# Patient Record
Sex: FEMALE | Race: WHITE | NOT HISPANIC OR LATINO | URBAN - METROPOLITAN AREA
[De-identification: names, ages, dates, MRNs, and addresses within clinical notes are randomized per-mention and may not be internally consistent; named-entity substitution may affect disease eponyms.]

---

## 2020-10-01 ENCOUNTER — OFFICE VISIT (OUTPATIENT)
Dept: URBAN - METROPOLITAN AREA CLINIC 68 | Facility: CLINIC | Age: 65
End: 2020-10-01

## 2021-01-13 ENCOUNTER — OFFICE VISIT (OUTPATIENT)
Dept: URBAN - METROPOLITAN AREA CLINIC 68 | Facility: CLINIC | Age: 66
End: 2021-01-13

## 2021-02-10 ENCOUNTER — OFFICE VISIT (OUTPATIENT)
Dept: URBAN - METROPOLITAN AREA SURGERY CENTER 12 | Facility: SURGERY CENTER | Age: 66
End: 2021-02-10

## 2021-02-11 ENCOUNTER — LAB OUTSIDE AN ENCOUNTER (OUTPATIENT)
Dept: URBAN - METROPOLITAN AREA CLINIC 68 | Facility: CLINIC | Age: 66
End: 2021-02-11

## 2021-02-11 LAB — 01: (no result)

## 2021-02-15 ENCOUNTER — TELEPHONE ENCOUNTER (OUTPATIENT)
Dept: URBAN - METROPOLITAN AREA CLINIC 68 | Facility: CLINIC | Age: 66
End: 2021-02-15

## 2021-03-05 ENCOUNTER — OFFICE VISIT (OUTPATIENT)
Dept: URBAN - METROPOLITAN AREA CLINIC 68 | Facility: CLINIC | Age: 66
End: 2021-03-05

## 2022-06-04 ENCOUNTER — TELEPHONE ENCOUNTER (OUTPATIENT)
Dept: URBAN - METROPOLITAN AREA CLINIC 68 | Facility: CLINIC | Age: 67
End: 2022-06-04

## 2022-06-05 ENCOUNTER — TELEPHONE ENCOUNTER (OUTPATIENT)
Dept: URBAN - METROPOLITAN AREA CLINIC 68 | Facility: CLINIC | Age: 67
End: 2022-06-05

## 2022-06-05 RX ORDER — SIMVASTATIN 20 MG/1
SIMVASTATIN( 20MG ORAL  DAILY ) ACTIVE -HX ENTRY TABLET, FILM COATED ORAL DAILY
Status: ACTIVE | COMMUNITY
Start: 2021-03-05

## 2022-06-05 RX ORDER — OMEPRAZOLE 20 MG/1
OMEPRAZOLE( 20MG ORAL  TWICE A DAY ) ACTIVE -HX ENTRY TABLET, DELAYED RELEASE ORAL TWICE A DAY
Status: ACTIVE | COMMUNITY
Start: 2021-03-05

## 2022-06-05 RX ORDER — POTASSIUM CHLORIDE 750 MG/1
KLOR-CON M10( 10MEQ ORAL  DAILY ) ACTIVE -HX ENTRY TABLET, EXTENDED RELEASE ORAL DAILY
Status: ACTIVE | COMMUNITY
Start: 2021-03-05

## 2022-06-05 RX ORDER — ATENOLOL 50 MG/1
ATENOLOL( 50MG ORAL  DAILY ) ACTIVE -HX ENTRY TABLET ORAL DAILY
Status: ACTIVE | COMMUNITY
Start: 2021-03-05

## 2022-06-05 RX ORDER — BIFIDOBACTERIUM LONGUM 10MM CELL
ALIGN( 4MG ORAL  DAILY ) ACTIVE -HX ENTRY CAPSULE ORAL DAILY
Status: ACTIVE | COMMUNITY
Start: 2021-03-05

## 2022-06-05 RX ORDER — HYDROCHLOROTHIAZIDE 25 MG/1
HYDROCHLOROTHIAZIDE( 25MG ORAL  DAILY ) ACTIVE -HX ENTRY TABLET ORAL DAILY
Status: ACTIVE | COMMUNITY
Start: 2021-03-05

## 2022-06-05 RX ORDER — WARFARIN 10 MG/1
WARFARIN SODIUM( 10MG ORAL 13 MG DAILY ) ACTIVE -HX ENTRY TABLET ORAL DAILY
Status: ACTIVE | COMMUNITY
Start: 2021-03-05

## 2022-06-25 ENCOUNTER — TELEPHONE ENCOUNTER (OUTPATIENT)
Age: 67
End: 2022-06-25

## 2022-06-26 ENCOUNTER — TELEPHONE ENCOUNTER (OUTPATIENT)
Age: 67
End: 2022-06-26

## 2022-06-26 RX ORDER — BIFIDOBACTERIUM LONGUM 10MM CELL
ALIGN( 4MG ORAL  DAILY ) ACTIVE -HX ENTRY CAPSULE ORAL DAILY
Status: ACTIVE | COMMUNITY
Start: 2021-03-05

## 2022-06-26 RX ORDER — SIMVASTATIN 20 MG/1
SIMVASTATIN( 20MG ORAL  DAILY ) ACTIVE -HX ENTRY TABLET, FILM COATED ORAL DAILY
Status: ACTIVE | COMMUNITY
Start: 2021-03-05

## 2022-06-26 RX ORDER — POTASSIUM CHLORIDE 750 MG/1
KLOR-CON M10( 10MEQ ORAL  DAILY ) ACTIVE -HX ENTRY TABLET, EXTENDED RELEASE ORAL DAILY
Status: ACTIVE | COMMUNITY
Start: 2021-03-05

## 2022-06-26 RX ORDER — QUINIDINE SULFATE 200 MG
VITAMIN D3(   2000 DAILY ) ACTIVE -HX ENTRY TABLET ORAL DAILY
Status: ACTIVE | COMMUNITY
Start: 2021-03-05

## 2022-06-26 RX ORDER — OMEPRAZOLE 20 MG/1
OMEPRAZOLE( 20MG ORAL  TWICE A DAY ) ACTIVE -HX ENTRY TABLET, DELAYED RELEASE ORAL TWICE A DAY
Status: ACTIVE | COMMUNITY
Start: 2021-03-05

## 2022-06-26 RX ORDER — HYDROCHLOROTHIAZIDE 25 MG/1
HYDROCHLOROTHIAZIDE( 25MG ORAL  DAILY ) ACTIVE -HX ENTRY TABLET ORAL DAILY
Status: ACTIVE | COMMUNITY
Start: 2021-03-05

## 2022-06-26 RX ORDER — WARFARIN 10 MG/1
WARFARIN SODIUM( 10MG ORAL 13 MG DAILY ) ACTIVE -HX ENTRY TABLET ORAL DAILY
Status: ACTIVE | COMMUNITY
Start: 2021-03-05

## 2022-06-26 RX ORDER — ATENOLOL 50 MG/1
ATENOLOL( 50MG ORAL  DAILY ) ACTIVE -HX ENTRY TABLET ORAL DAILY
Status: ACTIVE | COMMUNITY
Start: 2021-03-05

## 2022-06-26 RX ORDER — CALCIUM CARBONATE/VITAMIN D3 600 MG-10
CALCIUM(   1200 MG DAILY ) ACTIVE -HX ENTRY TABLET ORAL DAILY
Status: ACTIVE | COMMUNITY
Start: 2021-03-05

## 2022-09-16 ENCOUNTER — OFFICE VISIT (OUTPATIENT)
Dept: URBAN - METROPOLITAN AREA CLINIC 68 | Facility: CLINIC | Age: 67
End: 2022-09-16

## 2022-09-16 RX ORDER — SOD SULF/POT CHLORIDE/MAG SULF 1.479 G
AS DIRECTED TABLET ORAL ONCE
Qty: 1 PACKET | Refills: 0 | OUTPATIENT
Start: 2022-09-16

## 2022-09-16 RX ORDER — OMEPRAZOLE 20 MG/1
OMEPRAZOLE( 20MG ORAL  TWICE A DAY ) ACTIVE -HX ENTRY TABLET, DELAYED RELEASE ORAL TWICE A DAY
Status: ACTIVE | COMMUNITY
Start: 2021-03-05

## 2022-09-16 RX ORDER — HYDROCHLOROTHIAZIDE 25 MG/1
HYDROCHLOROTHIAZIDE( 25MG ORAL  DAILY ) ACTIVE -HX ENTRY TABLET ORAL DAILY
Status: ACTIVE | COMMUNITY
Start: 2021-03-05

## 2022-09-16 RX ORDER — WARFARIN 10 MG/1
WARFARIN SODIUM( 10MG ORAL 13 MG DAILY ) ACTIVE -HX ENTRY TABLET ORAL DAILY
Status: ACTIVE | COMMUNITY
Start: 2021-03-05

## 2022-09-16 RX ORDER — BIFIDOBACTERIUM LONGUM 10MM CELL
ALIGN( 4MG ORAL  DAILY ) ACTIVE -HX ENTRY CAPSULE ORAL DAILY
Status: ACTIVE | COMMUNITY
Start: 2021-03-05

## 2022-09-16 RX ORDER — POTASSIUM CHLORIDE 750 MG/1
KLOR-CON M10( 10MEQ ORAL  DAILY ) ACTIVE -HX ENTRY TABLET, EXTENDED RELEASE ORAL DAILY
Status: ACTIVE | COMMUNITY
Start: 2021-03-05

## 2022-09-16 RX ORDER — ATENOLOL 50 MG/1
ATENOLOL( 50MG ORAL  DAILY ) ACTIVE -HX ENTRY TABLET ORAL DAILY
Status: ACTIVE | COMMUNITY
Start: 2021-03-05

## 2022-09-16 RX ORDER — SIMVASTATIN 20 MG/1
SIMVASTATIN( 20MG ORAL  DAILY ) ACTIVE -HX ENTRY TABLET, FILM COATED ORAL DAILY
Status: ACTIVE | COMMUNITY
Start: 2021-03-05

## 2022-09-16 NOTE — HPI-MIGRATED HPI
Transition of Care : Patient evaluated due to Heartburn and need for screenign colonoscopy.  Referred having intermittent episodes of heartburn even using PPI tx.  Denies nausea, vomits, dysphagia, odynophagia, abdominal pain, diarrhea, constipation GI bleeding or weight loss

## 2022-10-20 ENCOUNTER — OFFICE VISIT (OUTPATIENT)
Dept: URBAN - METROPOLITAN AREA SURGERY CENTER 12 | Facility: SURGERY CENTER | Age: 67
End: 2022-10-20

## 2022-10-20 RX ORDER — BIFIDOBACTERIUM LONGUM 10MM CELL
ALIGN( 4MG ORAL  DAILY ) ACTIVE -HX ENTRY CAPSULE ORAL DAILY
Status: ACTIVE | COMMUNITY
Start: 2021-03-05

## 2022-10-20 RX ORDER — ATENOLOL 50 MG/1
ATENOLOL( 50MG ORAL  DAILY ) ACTIVE -HX ENTRY TABLET ORAL DAILY
Status: ACTIVE | COMMUNITY
Start: 2021-03-05

## 2022-10-20 RX ORDER — WARFARIN 10 MG/1
WARFARIN SODIUM( 10MG ORAL 13 MG DAILY ) ACTIVE -HX ENTRY TABLET ORAL DAILY
Status: ACTIVE | COMMUNITY
Start: 2021-03-05

## 2022-10-20 RX ORDER — POTASSIUM CHLORIDE 750 MG/1
KLOR-CON M10( 10MEQ ORAL  DAILY ) ACTIVE -HX ENTRY TABLET, EXTENDED RELEASE ORAL DAILY
Status: ACTIVE | COMMUNITY
Start: 2021-03-05

## 2022-10-20 RX ORDER — HYDROCHLOROTHIAZIDE 25 MG/1
HYDROCHLOROTHIAZIDE( 25MG ORAL  DAILY ) ACTIVE -HX ENTRY TABLET ORAL DAILY
Status: ACTIVE | COMMUNITY
Start: 2021-03-05

## 2022-10-20 RX ORDER — OMEPRAZOLE 20 MG/1
OMEPRAZOLE( 20MG ORAL  TWICE A DAY ) ACTIVE -HX ENTRY TABLET, DELAYED RELEASE ORAL TWICE A DAY
Status: ACTIVE | COMMUNITY
Start: 2021-03-05

## 2022-10-20 RX ORDER — SIMVASTATIN 20 MG/1
SIMVASTATIN( 20MG ORAL  DAILY ) ACTIVE -HX ENTRY TABLET, FILM COATED ORAL DAILY
Status: ACTIVE | COMMUNITY
Start: 2021-03-05

## 2022-10-20 RX ORDER — SOD SULF/POT CHLORIDE/MAG SULF 1.479 G
AS DIRECTED TABLET ORAL ONCE
Qty: 1 PACKET | Refills: 0 | Status: ACTIVE | COMMUNITY
Start: 2022-09-16

## 2022-10-27 ENCOUNTER — TELEPHONE ENCOUNTER (OUTPATIENT)
Dept: URBAN - METROPOLITAN AREA CLINIC 68 | Facility: CLINIC | Age: 67
End: 2022-10-27

## 2022-11-02 ENCOUNTER — OFFICE VISIT (OUTPATIENT)
Dept: URBAN - METROPOLITAN AREA CLINIC 68 | Facility: CLINIC | Age: 67
End: 2022-11-02

## 2022-11-02 ENCOUNTER — TELEPHONE ENCOUNTER (OUTPATIENT)
Dept: URBAN - METROPOLITAN AREA CLINIC 68 | Facility: CLINIC | Age: 67
End: 2022-11-02

## 2022-11-02 RX ORDER — BIFIDOBACTERIUM LONGUM 10MM CELL
ALIGN( 4MG ORAL  DAILY ) ACTIVE -HX ENTRY CAPSULE ORAL DAILY
Status: ACTIVE | COMMUNITY
Start: 2021-03-05

## 2022-11-02 RX ORDER — PANTOPRAZOLE SODIUM 40 MG/1
1 TABLET TABLET, DELAYED RELEASE ORAL ONCE A DAY
Qty: 90 TABLET | Refills: 1 | OUTPATIENT
Start: 2022-11-02

## 2022-11-02 RX ORDER — POTASSIUM CHLORIDE 750 MG/1
KLOR-CON M10( 10MEQ ORAL  DAILY ) ACTIVE -HX ENTRY TABLET, EXTENDED RELEASE ORAL DAILY
Status: ACTIVE | COMMUNITY
Start: 2021-03-05

## 2022-11-02 RX ORDER — HYDROCHLOROTHIAZIDE 25 MG/1
HYDROCHLOROTHIAZIDE( 25MG ORAL  DAILY ) ACTIVE -HX ENTRY TABLET ORAL DAILY
Status: ACTIVE | COMMUNITY
Start: 2021-03-05

## 2022-11-02 RX ORDER — SOD SULF/POT CHLORIDE/MAG SULF 1.479 G
AS DIRECTED TABLET ORAL ONCE
Qty: 1 PACKET | Refills: 0 | Status: ACTIVE | COMMUNITY
Start: 2022-09-16

## 2022-11-02 RX ORDER — SIMVASTATIN 20 MG/1
SIMVASTATIN( 20MG ORAL  DAILY ) ACTIVE -HX ENTRY TABLET, FILM COATED ORAL DAILY
Status: ACTIVE | COMMUNITY
Start: 2021-03-05

## 2022-11-02 RX ORDER — WARFARIN 10 MG/1
WARFARIN SODIUM( 10MG ORAL 13 MG DAILY ) ACTIVE -HX ENTRY TABLET ORAL DAILY
Status: ACTIVE | COMMUNITY
Start: 2021-03-05

## 2022-11-02 RX ORDER — OMEPRAZOLE 20 MG/1
OMEPRAZOLE( 20MG ORAL  TWICE A DAY ) ACTIVE -HX ENTRY TABLET, DELAYED RELEASE ORAL TWICE A DAY
Status: ACTIVE | COMMUNITY
Start: 2021-03-05

## 2022-11-02 RX ORDER — ATENOLOL 50 MG/1
ATENOLOL( 50MG ORAL  DAILY ) ACTIVE -HX ENTRY TABLET ORAL DAILY
Status: ACTIVE | COMMUNITY
Start: 2021-03-05

## 2022-11-02 NOTE — HPI-MIGRATED HPI
Transition of Care : Patient evaluated after having recent EGD found with mild gastritis and colonoscopy with polyp x 1.  Patient consented for video-audio encounter using [facility] Complaint [facility]. Referred having intermittent episodes heartburn even on bid omeprazole tx Denies nausea, vomits, dysphagia, odynophagia, heartburn, abdominal pain, diarrhea, constipation GI bleeding or weight loss

## 2023-02-03 ENCOUNTER — OFFICE VISIT (OUTPATIENT)
Dept: URBAN - METROPOLITAN AREA CLINIC 68 | Facility: CLINIC | Age: 68
End: 2023-02-03

## 2023-02-07 ENCOUNTER — OFFICE VISIT (OUTPATIENT)
Dept: URBAN - METROPOLITAN AREA CLINIC 66 | Facility: CLINIC | Age: 68
End: 2023-02-07

## 2023-02-07 RX ORDER — ATENOLOL 50 MG/1
ATENOLOL( 50MG ORAL  DAILY ) ACTIVE -HX ENTRY TABLET ORAL DAILY
Status: ACTIVE | COMMUNITY
Start: 2021-03-05

## 2023-02-07 RX ORDER — SIMVASTATIN 20 MG/1
SIMVASTATIN( 20MG ORAL  DAILY ) ACTIVE -HX ENTRY TABLET, FILM COATED ORAL DAILY
Status: ACTIVE | COMMUNITY
Start: 2021-03-05

## 2023-02-07 RX ORDER — BIFIDOBACTERIUM LONGUM 10MM CELL
ALIGN( 4MG ORAL  DAILY ) ACTIVE -HX ENTRY CAPSULE ORAL DAILY
Status: ACTIVE | COMMUNITY
Start: 2021-03-05

## 2023-02-07 RX ORDER — SOD SULF/POT CHLORIDE/MAG SULF 1.479 G
AS DIRECTED TABLET ORAL ONCE
Qty: 1 PACKET | Refills: 0 | Status: ACTIVE | COMMUNITY
Start: 2022-09-16

## 2023-02-07 RX ORDER — HYDROCHLOROTHIAZIDE 25 MG/1
HYDROCHLOROTHIAZIDE( 25MG ORAL  DAILY ) ACTIVE -HX ENTRY TABLET ORAL DAILY
Status: ACTIVE | COMMUNITY
Start: 2021-03-05

## 2023-02-07 RX ORDER — POTASSIUM CHLORIDE 750 MG/1
KLOR-CON M10( 10MEQ ORAL  DAILY ) ACTIVE -HX ENTRY TABLET, EXTENDED RELEASE ORAL DAILY
Status: ACTIVE | COMMUNITY
Start: 2021-03-05

## 2023-02-07 RX ORDER — OMEPRAZOLE 20 MG/1
OMEPRAZOLE( 20MG ORAL  TWICE A DAY ) ACTIVE -HX ENTRY TABLET, DELAYED RELEASE ORAL TWICE A DAY
Status: ACTIVE | COMMUNITY
Start: 2021-03-05

## 2023-02-07 RX ORDER — WARFARIN 10 MG/1
WARFARIN SODIUM( 10MG ORAL 13 MG DAILY ) ACTIVE -HX ENTRY TABLET ORAL DAILY
Status: ACTIVE | COMMUNITY
Start: 2021-03-05

## 2023-02-07 RX ORDER — PANTOPRAZOLE SODIUM 40 MG/1
1 TABLET TABLET, DELAYED RELEASE ORAL ONCE A DAY
Qty: 90 TABLET | Refills: 1 | Status: ACTIVE | COMMUNITY
Start: 2022-11-02

## 2023-02-07 NOTE — HPI-MIGRATED HPI
Transition of Care : Patient evaluated due to heartburn.  Patient consented for video-audio encounter using [facility] Complaint [facility]. Today referred that is feeling good with resolved heartburn/gerd symptoms since started daily pantoprazole.  Denies nausea, vomits, dysphagia, odynophagia,  abdominal pain, diarrhea, constipation GI bleeding or weight loss

## 2023-05-08 ENCOUNTER — DASHBOARD ENCOUNTERS (OUTPATIENT)
Age: 68
End: 2023-05-08

## 2023-05-08 ENCOUNTER — OFFICE VISIT (OUTPATIENT)
Dept: URBAN - METROPOLITAN AREA CLINIC 68 | Facility: CLINIC | Age: 68
End: 2023-05-08

## 2023-05-08 RX ORDER — ATENOLOL 50 MG/1
ATENOLOL( 50MG ORAL  DAILY ) ACTIVE -HX ENTRY TABLET ORAL DAILY
Status: ACTIVE | COMMUNITY
Start: 2021-03-05

## 2023-05-08 RX ORDER — OMEPRAZOLE 20 MG/1
OMEPRAZOLE( 20MG ORAL  TWICE A DAY ) ACTIVE -HX ENTRY TABLET, DELAYED RELEASE ORAL TWICE A DAY
Status: ACTIVE | COMMUNITY
Start: 2021-03-05

## 2023-05-08 RX ORDER — SOD SULF/POT CHLORIDE/MAG SULF 1.479 G
AS DIRECTED TABLET ORAL ONCE
Qty: 1 PACKET | Refills: 0 | Status: ACTIVE | COMMUNITY
Start: 2022-09-16

## 2023-05-08 RX ORDER — PANTOPRAZOLE SODIUM 40 MG/1
1 TABLET TABLET, DELAYED RELEASE ORAL ONCE A DAY
Qty: 90 TABLET | Refills: 1
Start: 2022-11-02

## 2023-05-08 RX ORDER — HYDROCHLOROTHIAZIDE 25 MG/1
HYDROCHLOROTHIAZIDE( 25MG ORAL  DAILY ) ACTIVE -HX ENTRY TABLET ORAL DAILY
Status: ACTIVE | COMMUNITY
Start: 2021-03-05

## 2023-05-08 RX ORDER — SIMVASTATIN 20 MG/1
SIMVASTATIN( 20MG ORAL  DAILY ) ACTIVE -HX ENTRY TABLET, FILM COATED ORAL DAILY
Status: ACTIVE | COMMUNITY
Start: 2021-03-05

## 2023-05-08 RX ORDER — POTASSIUM CHLORIDE 750 MG/1
KLOR-CON M10( 10MEQ ORAL  DAILY ) ACTIVE -HX ENTRY TABLET, EXTENDED RELEASE ORAL DAILY
Status: ACTIVE | COMMUNITY
Start: 2021-03-05

## 2023-05-08 RX ORDER — PANTOPRAZOLE SODIUM 40 MG/1
1 TABLET TABLET, DELAYED RELEASE ORAL ONCE A DAY
Qty: 90 TABLET | Refills: 1 | Status: ACTIVE | COMMUNITY
Start: 2022-11-02

## 2023-05-08 RX ORDER — WARFARIN 10 MG/1
WARFARIN SODIUM( 10MG ORAL 13 MG DAILY ) ACTIVE -HX ENTRY TABLET ORAL DAILY
Status: ACTIVE | COMMUNITY
Start: 2021-03-05

## 2023-05-08 RX ORDER — BIFIDOBACTERIUM LONGUM 10MM CELL
ALIGN( 4MG ORAL  DAILY ) ACTIVE -HX ENTRY CAPSULE ORAL DAILY
Status: ACTIVE | COMMUNITY
Start: 2021-03-05

## 2023-05-08 NOTE — HPI-MIGRATED HPI
General : Patient evaluated due to heartburn.  Patient consented for video-audio encounter using [facility] Complaint [facility]. Today referred that is feeling good, currently on daily pantoprazole.  Denies nausea, vomits, dysphagia, odynophagia, heartburn, abdominal pain, diarrhea, constipation GI bleeding or weight loss

## 2023-06-23 ENCOUNTER — TELEPHONE ENCOUNTER (OUTPATIENT)
Dept: URBAN - METROPOLITAN AREA CLINIC 7 | Facility: CLINIC | Age: 68
End: 2023-06-23